# Patient Record
Sex: FEMALE | Race: OTHER | HISPANIC OR LATINO
[De-identification: names, ages, dates, MRNs, and addresses within clinical notes are randomized per-mention and may not be internally consistent; named-entity substitution may affect disease eponyms.]

---

## 2017-01-27 ENCOUNTER — RECORD ABSTRACTING (OUTPATIENT)
Age: 52
End: 2017-01-27

## 2017-01-27 DIAGNOSIS — Z12.72 ENCOUNTER FOR SCREENING FOR MALIGNANT NEOPLASM OF VAGINA: ICD-10-CM

## 2017-01-27 DIAGNOSIS — E27.8 OTHER SPECIFIED DISORDERS OF ADRENAL GLAND: ICD-10-CM

## 2017-01-27 DIAGNOSIS — K76.0 FATTY (CHANGE OF) LIVER, NOT ELSEWHERE CLASSIFIED: ICD-10-CM

## 2017-01-27 DIAGNOSIS — Z92.89 PERSONAL HISTORY OF OTHER MEDICAL TREATMENT: ICD-10-CM

## 2017-01-27 DIAGNOSIS — Z87.19 PERSONAL HISTORY OF OTHER DISEASES OF THE DIGESTIVE SYSTEM: ICD-10-CM

## 2017-01-27 DIAGNOSIS — K57.90 DIVERTICULOSIS OF INTESTINE, PART UNSPECIFIED, W/OUT PERFORATION OR ABSCESS W/OUT BLEEDING: ICD-10-CM

## 2017-01-27 DIAGNOSIS — Z78.9 OTHER SPECIFIED HEALTH STATUS: ICD-10-CM

## 2017-01-27 PROBLEM — Z00.00 ENCOUNTER FOR PREVENTIVE HEALTH EXAMINATION: Status: ACTIVE | Noted: 2017-01-27

## 2017-01-27 RX ORDER — DOCUSATE SODIUM 100 MG/1
CAPSULE ORAL
Refills: 0 | Status: ACTIVE | COMMUNITY

## 2017-01-27 RX ORDER — SENNOSIDES 8.6 MG TABLETS 8.6 MG/1
TABLET ORAL
Refills: 0 | Status: ACTIVE | COMMUNITY

## 2017-03-10 ENCOUNTER — RECORD ABSTRACTING (OUTPATIENT)
Age: 52
End: 2017-03-10

## 2017-03-10 DIAGNOSIS — Z12.4 ENCOUNTER FOR SCREENING FOR MALIGNANT NEOPLASM OF CERVIX: ICD-10-CM

## 2020-12-30 ENCOUNTER — EMERGENCY (EMERGENCY)
Facility: HOSPITAL | Age: 55
LOS: 0 days | Discharge: HOME | End: 2020-12-30
Attending: EMERGENCY MEDICINE | Admitting: EMERGENCY MEDICINE
Payer: MEDICAID

## 2020-12-30 VITALS
SYSTOLIC BLOOD PRESSURE: 153 MMHG | HEART RATE: 80 BPM | TEMPERATURE: 98 F | RESPIRATION RATE: 18 BRPM | OXYGEN SATURATION: 100 % | DIASTOLIC BLOOD PRESSURE: 74 MMHG

## 2020-12-30 DIAGNOSIS — H92.09 OTALGIA, UNSPECIFIED EAR: ICD-10-CM

## 2020-12-30 DIAGNOSIS — K08.89 OTHER SPECIFIED DISORDERS OF TEETH AND SUPPORTING STRUCTURES: ICD-10-CM

## 2020-12-30 DIAGNOSIS — H66.91 OTITIS MEDIA, UNSPECIFIED, RIGHT EAR: ICD-10-CM

## 2020-12-30 PROCEDURE — 99282 EMERGENCY DEPT VISIT SF MDM: CPT

## 2020-12-30 RX ORDER — AMOXICILLIN 250 MG/5ML
1 SUSPENSION, RECONSTITUTED, ORAL (ML) ORAL
Qty: 20 | Refills: 0
Start: 2020-12-30 | End: 2021-01-08

## 2020-12-30 RX ORDER — IBUPROFEN 200 MG
400 TABLET ORAL ONCE
Refills: 0 | Status: COMPLETED | OUTPATIENT
Start: 2020-12-30 | End: 2020-12-30

## 2020-12-30 RX ADMIN — Medication 400 MILLIGRAM(S): at 13:20

## 2020-12-30 NOTE — ED PROVIDER NOTE - PROGRESS NOTE DETAILS
will transfer to dental for further eval.  script for amox sent to pt's pharmacy., ED Attending CARLINE Georges  Pt aware of abx, both for ear and teeth. pt aware of plan for transfer to dental clinic, in no resp distress, no drooling or secretions, agrees with plan.

## 2020-12-30 NOTE — ED PROVIDER NOTE - NS ED ROS FT
CONST: No fever, chills or bodyaches  EYES: No pain, redness, drainage or visual changes.  ENT: + ear pain. + dentalgia. No discharge, nasal discharge or congestion. No sore throat  CARD: No chest pain, palpitations  RESP: No SOB, cough, hemoptysis.   GI: No abdominal pain, N/V/D  MS: No joint pain, back pain or extremity pain/injury  SKIN: No rashes  NEURO: No headache, dizziness, paresthesias

## 2020-12-30 NOTE — ED PROVIDER NOTE - NSFOLLOWUPINSTRUCTIONS_ED_ALL_ED_FT
Dental Pain    Dental pain (toothache) may be caused by many things including tooth decay (cavities or caries), abscess or infection, or trauma. If you were prescribed an antibiotic medicine, finish all of it even if you start to feel better. Rinsing your mouth with salt water or applying ice to the painful area of your face may help with the pain. Follow up with a dentist is important in ensuring good oral health and preventing the worsening of dental disease.    SEEK IMMEDIATE MEDICAL CARE IF YOU HAVE ANY OF THE FOLLOWING SYMPTOMS: unable to open your mouth, trouble breathing or swallowing, fever, or swelling of the face, neck, or jaw.    Otitis Media    Otitis media is inflammation of the middle ear. Otitis media may be caused by allergies or, most commonly, by a viral or bacterial infection. Symptoms may include earache, fever, ringing in your ears, leakage of fluid from ear, or hearing changes. If you were prescribed an antibiotic medicine, be sure to finish it all even if you start to feel better.     SEEK IMMEDIATE MEDICAL CARE IF YOU HAVE ANY OF THE FOLLOWING SYMPTOMS: pain that is not controlled with medicine, swelling/redness/pain around your ear, facial paralysis, tenderness of the bone behind your ear when you touch it, neck lump or neck stiffness.    Follow up with your primary medical doctor in 1-2 days

## 2020-12-30 NOTE — ED PROVIDER NOTE - CLINICAL SUMMARY MEDICAL DECISION MAKING FREE TEXT BOX
pt presented for ear pain radiating to R side of mouth, no signs of mastoiditis, no neck or facial swelling, no distress, speaking full sentences, abx xent to pharmacy, aware of plan for transfer to dental, signs and symptoms to return for, reports will follow up with pmd, ent as well.

## 2020-12-30 NOTE — ED PROVIDER NOTE - ATTENDING CONTRIBUTION TO CARE
56 y/o f w/ pm no sig pmhx presents with R ear pain x ~ 3 days, throbbing, reports radiates to R side of her mouth, so was not sure if it was dental in origin. did not follow up with anyone, took tyl with no relief. denies fever, chills, n/v, cp, sob, pleuritic chest pain, palpitations, diaphoresis, cough, hearing changes, ear drainage, sore throat, drooling/secretions, trismus, neck swelling, neck stiffness, muffled/change in voice, dysphagia, odonoyphagia, drainage, trauma, weakness, numbness/tingling, sick contacts, recent travel or rash.      on exam: wdwn female sitting on stretcher speaking full sentences, no erythema/ exudates or petechiae to phrynx, no PTA. R TM dull, no drainina, L TM visualized with good cone of light, hearing intact, no effusions, no pain/swelling/erythema/ ecchymoses to mastoids b/l, no sniffing position, no muffled or hot potato voice, no rash, no facial swelling, mmm, multipel dental caries, (+) missing teeth. pain to palpation to tooth # 28  with no surrounding fluctuance/induration, no halitosis, no trismus, no drooling/secretions, no pain to floor of mouth, no elevation to floor of mouth, no oropharygeal edema, uvula midline, no PTA, no neck swelling- supple, non-tender, no atnerior neck pain, RRR, radial pulses 2/4 b/l, ctabl w/ breath sounds present b/l, no wheezing or crackles, no accessory muscle use, no tachypnea, no stridor,  AAOx3. CN II-XII intact. no facial droop or slurring of speech. No focal deficits.

## 2020-12-30 NOTE — ED PROVIDER NOTE - PHYSICAL EXAMINATION
CONST: Well appearing in NAD  EYES: Sclera and conjunctiva clear.  ENT: dull and mildly erythematous R TM, canal unremarkable, no mastoid tenderness, L Tm unremarkable, No nasal discharge. poor dentition, + TTP multiple teeth R lower mandible, no obvious abscess, no trismus, clear speech.   NECK: Non-tender,  supple  CARD: Normal S1 S2; Normal rate and rhythm  RESP: Equal BS B/L, No wheezes, rhonchi or rales. No distress  SKIN: Warm, dry, no acute rashes. Good turgor  NEURO: A&Ox3, No focal deficits. Strength 5/5 with no sensory deficits. Steady gait

## 2020-12-30 NOTE — ED PROVIDER NOTE - OBJECTIVE STATEMENT
55 y.o female w/ no known PMHx presents to the ED for evaluation of ear pain x 3 days.  Intermittent, achy, mild severity, some relief w/ OTC meds.  No fever, headache, nausea, vomiting, chest pain, dyspnea, discharge from R ear.  NO further complaints. also reports dentalgia. no trismus, sore throat,

## 2020-12-30 NOTE — CONSULT NOTE ADULT - SUBJECTIVE AND OBJECTIVE BOX
Patient is a 55y old  Female who presents with a chief complaint of tooth pain in the lower right    HPI: Patient said she has been having pain for the last 3 days    PAST MEDICAL & SURGICAL HISTORY:  No pertinent past medical history    MEDICATIONS  (STANDING):    MEDICATIONS  (PRN):    Allergies    No Known Allergies    Intolerances    FAMILY HISTORY:    *SOCIAL HISTORY: (   ) Tobacco; (   ) ETOH    *Last Dental Visit:    Vital Signs Last 24 Hrs  T(C): 36.5 (30 Dec 2020 11:58), Max: 36.5 (30 Dec 2020 11:58)  T(F): 97.7 (30 Dec 2020 11:58), Max: 97.7 (30 Dec 2020 11:58)  HR: 80 (30 Dec 2020 11:58) (80 - 80)  BP: 153/74 (30 Dec 2020 11:58) (153/74 - 153/74)  BP(mean): --  RR: 18 (30 Dec 2020 11:58) (18 - 18)  SpO2: 100% (30 Dec 2020 11:58) (100% - 100%)    EOE:  TMJ ( -  ) clicks                     ( -  ) pops                     ( -  ) crepitus             Mandible <<FROM>>             Facial bones and MOM <<grossly intact>>             ( -  ) trismus             ( -  ) lymphadenopathy             ( -  ) swelling             ( -  ) asymmetry             ( -  ) palpation             ( -  ) dyspnea             ( -  ) dysphagia             ( -  ) loss of consciousness    IOE:  permanent dentition: multiple missing teeth           hard/soft palate:  ( - ) palatal torus, <<No pathology noted>>           tongue/FOM <<No pathology noted>>           labial/buccal mucosa <<No pathology noted>>           ( + ) percussion           ( -  ) palpation           ( -  ) swelling            ( -  ) abscess           ( -  ) sinus tract    *DENTAL RADIOGRAPHS: 1 periapical radiograph taken of #29 and #31    RADIOLOGY & ADDITIONAL STUDIES:    *ASSESSMENT: Periapical pathology seen at the apex of #29    *PLAN: Patient would need to have a root canal on #29    PROCEDURE:   Explained to patient that she needed a root canal on #29 due to an infection that has reached the apex of the tooth. However, we do not take her insurance at the dental clinic. Told her we would give her medication to help with the infection and pain and that she would need to find a dentist who takes her insurance by calling her insurance company. Prescribed Amoxicillin 500mg 3 times a day for 7 days and Ibuprofen 600mg.    RECOMMENDATIONS:  1) Take prescribed medication as indicated  2) Dental F/U with outpatient dentist for comprehensive dental care.   3) If any difficulty swallowing/breathing, fever occur, return to ER.     Resident Name, pager #

## 2021-01-05 ENCOUNTER — EMERGENCY (EMERGENCY)
Facility: HOSPITAL | Age: 56
LOS: 0 days | Discharge: HOME | End: 2021-01-05
Attending: STUDENT IN AN ORGANIZED HEALTH CARE EDUCATION/TRAINING PROGRAM | Admitting: STUDENT IN AN ORGANIZED HEALTH CARE EDUCATION/TRAINING PROGRAM
Payer: MEDICAID

## 2021-01-05 VITALS
DIASTOLIC BLOOD PRESSURE: 79 MMHG | RESPIRATION RATE: 18 BRPM | SYSTOLIC BLOOD PRESSURE: 143 MMHG | HEART RATE: 69 BPM | OXYGEN SATURATION: 99 % | TEMPERATURE: 98 F

## 2021-01-05 DIAGNOSIS — K08.89 OTHER SPECIFIED DISORDERS OF TEETH AND SUPPORTING STRUCTURES: ICD-10-CM

## 2021-01-05 PROBLEM — Z78.9 OTHER SPECIFIED HEALTH STATUS: Chronic | Status: ACTIVE | Noted: 2020-12-30

## 2021-01-05 PROCEDURE — 99282 EMERGENCY DEPT VISIT SF MDM: CPT

## 2021-01-05 NOTE — ED PROVIDER NOTE - OBJECTIVE STATEMENT
55 year old female with no pmhx presents with dental pain. Pt has seen dental clinic for avulsed and infected tooth 29. pt was supposed to follow up with oral surgeon, however was confused and came to ED. pt is on amoxicillin. no fever, or chills.

## 2021-01-05 NOTE — ED PROVIDER NOTE - PHYSICAL EXAMINATION
CONST: Well appearing in NAD  EYES: PERRL, EOMI, Sclera and conjunctiva clear.   ENT: ttp to tooth 29, with small avulsion. No nasal discharge. TM's clear B/L without drainage. Oropharynx normal appearing, no erythema or exudates. No abscess or swelling. Uvula midline. No temporal artery or mastoid tenderness.  NECK: Non-tender, no meningeal signs. normal ROM. supple   SKIN: Warm, dry, no acute rashes. Good turgor

## 2021-01-05 NOTE — ED PROVIDER NOTE - NSFOLLOWUPCLINICS_GEN_ALL_ED_FT
[facility]  Dental  [address] [place] [id number]  Phone: [phone]  Fax:   Follow Up Time:

## 2021-01-05 NOTE — ED PROVIDER NOTE - ATTENDING CONTRIBUTION TO CARE
56 yo f no pmh presents w/ dental pain to tooth 29. pt was supposed to see oral surgeon at clinic but came in ED instead of dental. pt on amox. no significant worsening of sx.     vss  gen- NAD, aaox3  Dental- ttp to tooth 29, with small avulsion  card-rrr  lungs-ctab, no wheezing or rhonchi    will ref to dental, no acute ED intervention necessary

## 2021-01-05 NOTE — ED PROVIDER NOTE - PATIENT PORTAL LINK FT
You can access the FollowMyHealth Patient Portal offered by [facility] by registering at the following website: [email or web address]. By joining [facility] FollowMyHealth portal, you will also be able to view your health information using other applications (apps) compatible with our system.

## 2021-01-06 ENCOUNTER — OUTPATIENT (OUTPATIENT)
Dept: OUTPATIENT SERVICES | Facility: HOSPITAL | Age: 56
LOS: 1 days | Discharge: HOME | End: 2021-01-06

## 2021-04-05 ENCOUNTER — EMERGENCY (EMERGENCY)
Facility: HOSPITAL | Age: 56
LOS: 0 days | Discharge: HOME | End: 2021-04-05
Attending: EMERGENCY MEDICINE | Admitting: EMERGENCY MEDICINE
Payer: MEDICAID

## 2021-04-05 VITALS
RESPIRATION RATE: 18 BRPM | HEART RATE: 64 BPM | OXYGEN SATURATION: 98 % | TEMPERATURE: 97 F | DIASTOLIC BLOOD PRESSURE: 73 MMHG | SYSTOLIC BLOOD PRESSURE: 142 MMHG

## 2021-04-05 DIAGNOSIS — Z79.899 OTHER LONG TERM (CURRENT) DRUG THERAPY: ICD-10-CM

## 2021-04-05 DIAGNOSIS — K08.89 OTHER SPECIFIED DISORDERS OF TEETH AND SUPPORTING STRUCTURES: ICD-10-CM
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
## 2021-04-05 DIAGNOSIS — S02.5XXA FRACTURE OF TOOTH (TRAUMATIC), INITIAL ENCOUNTER FOR CLOSED FRACTURE: ICD-10-CM
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
## 2021-04-05 PROCEDURE — 99284 EMERGENCY DEPT VISIT MOD MDM: CPT

## 2021-04-05 RX ORDER — IBUPROFEN 200 MG
600 TABLET ORAL ONCE
Refills: 0 | Status: COMPLETED | OUTPATIENT
Start: 2021-04-05 | End: 2021-04-05

## 2021-04-05 RX ADMIN — Medication 600 MILLIGRAM(S): at 10:06

## 2021-04-05 NOTE — ED PROVIDER NOTE - OBJECTIVE STATEMENT
54yo female with no significant PMHx presents c/o R lower dental pain x 1mth and "cracked" tooth x 1 week, progressively worsening, constant, aggaravted by chewing, relieved by nothing. Pt notes she was seen in dental clinic 1 mth prior 2/2 pain to same , however tooth recently cracked last week. Pt denies fever, chills, trismus, jaw swelling.

## 2021-04-05 NOTE — ED PROVIDER NOTE - PHYSICAL EXAMINATION
CONST: Well appearing in NAD  EYES: PERRL, EOMI, Sclera and conjunctiva clear.  ENT: No nasal discharge. Oropharynx normal appearing, no erythema or exudates. Uvula midline. #31 fractured, ttp. No maxillary swelling  CARD: Normal S1 S2; Normal rate and rhythm  RESP: Equal BS B/L, No wheezes, rhonchi or rales. No distress  SKIN: Warm, dry, no acute rashes. Good turgor  NEURO: A&Ox3, No focal deficits. Strength 5/5 with no sensory deficits.

## 2021-04-05 NOTE — CONSULT NOTE ADULT - SUBJECTIVE AND OBJECTIVE BOX
Patient is a 55y old  Female who presents with a chief complaint of tooth pain.    HPI: Pt reports lower right side spontaneous tooth pain. Reports no swelling or trismus.      PAST MEDICAL & SURGICAL HISTORY:  No pertinent past medical history      MEDICATIONS  (STANDING):  Denies    MEDICATIONS  (PRN):  Denies    Allergies  NKDA        Vital Signs Last 24 Hrs  T(C): 36.2 (05 Apr 2021 09:50), Max: 36.2 (05 Apr 2021 09:50)  T(F): 97.2 (05 Apr 2021 09:50), Max: 97.2 (05 Apr 2021 09:50)  HR: 64 (05 Apr 2021 09:50) (64 - 64)  BP: 142/73 (05 Apr 2021 09:50) (142/73 - 142/73)  BP(mean): --  RR: 18 (05 Apr 2021 09:50) (18 - 18)  SpO2: 98% (05 Apr 2021 09:50) (98% - 98%)      -----------------------------------------------------------------------------    O: Clinical exam reveals tooth #29 positive to percussion, mesiodistal coronal fracture. No mobility observed.  A/P: Tooth #29 possibly restorable with root canal, post, core, crown. Alternative treatment is extraction.  S: Pt states that she is not interested in saving tooth and prefers extraction. Treatment consequences explained as per OS sheet dated 7/13/00. Consent and side/site signed. Anesthetized with 2 carpules lidocaine 2% w/ 1:100,000 epi via mandibular blocks, 2 carpules septocaine 4% w/ 1:100,000 epi via local infiltration. Tooth #29 elevated and extracted. Socket curetted and irrigated. Hemostasis achieved. Post op radiograph negative. Post op instructions given. Prescriptions written: 1) Amoxicillin 500mg (q8h x 7 days), 2) ibuprofen 600mg (q6h x PRN)      RECOMMENDATIONS:  1) Dental F/U with outpatient dentist for comprehensive dental care.   2) If any difficulty swallowing/breathing, fever occur, return to ER.     Omer Lisa DDS

## 2021-04-05 NOTE — ED PROVIDER NOTE - NS ED ROS FT
CONST: No fever, chills or bodyaches  EYES: No pain, redness, drainage or visual changes.  ENT: No ear pain or discharge, nasal discharge or congestion. No sore throat. R lower dental pain  CARD: No chest pain, palpitations  RESP: No SOB, cough  SKIN: No rashes  NEURO: No headache, dizziness, paresthesias or LOC

## 2021-04-05 NOTE — ED PROVIDER NOTE - CLINICAL SUMMARY MEDICAL DECISION MAKING FREE TEXT BOX
55F p/w right lower dental pain x 1 month- Fractured tooth #31 and multiple dental caries. Sent to dental clinic. [facility]
